# Patient Record
Sex: MALE | Race: ASIAN | URBAN - METROPOLITAN AREA
[De-identification: names, ages, dates, MRNs, and addresses within clinical notes are randomized per-mention and may not be internally consistent; named-entity substitution may affect disease eponyms.]

---

## 2021-03-31 ENCOUNTER — OFFICE VISIT (OUTPATIENT)
Dept: URBAN - METROPOLITAN AREA CLINIC 44 | Facility: CLINIC | Age: 41
End: 2021-03-31
Payer: COMMERCIAL

## 2021-03-31 DIAGNOSIS — E11.9 TYPE 2 DIABETES MELLITUS WITHOUT COMPLICATIONS: Primary | ICD-10-CM

## 2021-03-31 DIAGNOSIS — Z79.4 LONG TERM (CURRENT) USE OF INSULIN: ICD-10-CM

## 2021-03-31 DIAGNOSIS — H18.609 KERATOCONUS: ICD-10-CM

## 2021-03-31 DIAGNOSIS — H04.123 DRY EYE SYNDROME OF BILATERAL LACRIMAL GLANDS: ICD-10-CM

## 2021-03-31 PROCEDURE — 92134 CPTRZ OPH DX IMG PST SGM RTA: CPT | Performed by: STUDENT IN AN ORGANIZED HEALTH CARE EDUCATION/TRAINING PROGRAM

## 2021-03-31 PROCEDURE — 92004 COMPRE OPH EXAM NEW PT 1/>: CPT | Performed by: STUDENT IN AN ORGANIZED HEALTH CARE EDUCATION/TRAINING PROGRAM

## 2021-03-31 PROCEDURE — 92025 CPTRIZED CORNEAL TOPOGRAPHY: CPT | Performed by: STUDENT IN AN ORGANIZED HEALTH CARE EDUCATION/TRAINING PROGRAM

## 2021-03-31 ASSESSMENT — VISUAL ACUITY
OD: 20/30
OS: 20/40

## 2021-03-31 ASSESSMENT — INTRAOCULAR PRESSURE
OD: 8
OS: 10

## 2021-03-31 ASSESSMENT — KERATOMETRY
OD: 36.88
OS: 34.63

## 2021-03-31 NOTE — IMPRESSION/PLAN
Impression: Dry eye syndrome of bilateral lacrimal glands: H04.123. Plan: Patient educated on signs and symptoms of dry eye and importance of environmental factors. Discussed using OTC AT's with patient QID OU long-term. Educated patient if signs or symptoms worsen to RTC for dry eye work-up.

## 2021-03-31 NOTE — IMPRESSION/PLAN
Impression: Keratoconus: I33.830.
 -Ectasia following Veronica 86 OU Plan: Patient educated on condition, unaware of condition. Patient interested in more information. 

RTC next available for brief f/u

## 2021-03-31 NOTE — IMPRESSION/PLAN
Impression: Type 2 diabetes mellitus without complications: L47.6. Plan: Patient educated on condition, importance of diet, exercise, and regular follow ups with PCP. No NPDR or DME present, good blood sugar control continue to monitor with annual DM DFE.

## 2021-04-06 ENCOUNTER — OFFICE VISIT (OUTPATIENT)
Dept: URBAN - METROPOLITAN AREA CLINIC 43 | Facility: CLINIC | Age: 41
End: 2021-04-06
Payer: COMMERCIAL

## 2021-04-06 PROCEDURE — 99213 OFFICE O/P EST LOW 20 MIN: CPT | Performed by: STUDENT IN AN ORGANIZED HEALTH CARE EDUCATION/TRAINING PROGRAM

## 2021-04-06 ASSESSMENT — INTRAOCULAR PRESSURE
OS: 11
OD: 11

## 2021-04-06 NOTE — IMPRESSION/PLAN
Impression: Keratoconus: G59.518. Plan: Educated patient on condition, patient is interested in Specialty CL fitting to see if improvements can be made. Referred patient to [facility] for evaluation.